# Patient Record
Sex: MALE | Race: ASIAN | NOT HISPANIC OR LATINO | URBAN - METROPOLITAN AREA
[De-identification: names, ages, dates, MRNs, and addresses within clinical notes are randomized per-mention and may not be internally consistent; named-entity substitution may affect disease eponyms.]

---

## 2018-12-19 ENCOUNTER — EMERGENCY (EMERGENCY)
Facility: HOSPITAL | Age: 23
LOS: 1 days | Discharge: HOME | End: 2018-12-19
Attending: EMERGENCY MEDICINE
Payer: COMMERCIAL

## 2018-12-19 VITALS
TEMPERATURE: 98 F | DIASTOLIC BLOOD PRESSURE: 82 MMHG | RESPIRATION RATE: 16 BRPM | HEART RATE: 88 BPM | HEIGHT: 68 IN | SYSTOLIC BLOOD PRESSURE: 145 MMHG | OXYGEN SATURATION: 99 % | WEIGHT: 171.96 LBS

## 2018-12-19 VITALS — OXYGEN SATURATION: 100 %

## 2018-12-19 PROCEDURE — 96372 THER/PROPH/DIAG INJ SC/IM: CPT

## 2018-12-19 PROCEDURE — 99283 EMERGENCY DEPT VISIT LOW MDM: CPT | Mod: 25

## 2018-12-19 PROCEDURE — 87081 CULTURE SCREEN ONLY: CPT

## 2018-12-19 RX ORDER — IBUPROFEN 200 MG
1 TABLET ORAL
Qty: 40 | Refills: 0 | OUTPATIENT
Start: 2018-12-19 | End: 2018-12-28

## 2018-12-19 RX ORDER — PENICILLIN G BENZATHINE 1200000 [IU]/2ML
1.2 INJECTION, SUSPENSION INTRAMUSCULAR ONCE
Qty: 0 | Refills: 0 | Status: COMPLETED | OUTPATIENT
Start: 2018-12-19 | End: 2018-12-19

## 2018-12-19 RX ORDER — IBUPROFEN 200 MG
800 TABLET ORAL ONCE
Qty: 0 | Refills: 0 | Status: COMPLETED | OUTPATIENT
Start: 2018-12-19 | End: 2018-12-19

## 2018-12-19 RX ADMIN — Medication 800 MILLIGRAM(S): at 01:18

## 2018-12-19 RX ADMIN — PENICILLIN G BENZATHINE 1.2 MILLION UNIT(S): 1200000 INJECTION, SUSPENSION INTRAMUSCULAR at 01:18

## 2018-12-19 NOTE — ED PROVIDER NOTE - MEDICAL DECISION MAKING DETAILS
22yo M presents with throat pain and fever. Exam cw strep pharyngitis. given bicillin IM and ibuprofen. Pt stable for discharge.

## 2018-12-19 NOTE — ED ADULT NURSE NOTE - NSIMPLEMENTINTERV_GEN_ALL_ED
Implemented All Universal Safety Interventions:  [place] to call system. Call bell, personal items and telephone within reach. Instruct patient to call for assistance. Room bathroom lighting operational. Non-slip footwear when patient is off stretcher. Physically safe environment: no spills, clutter or unnecessary equipment. Stretcher in lowest position, wheels locked, appropriate side rails in place.

## 2018-12-19 NOTE — ED PROVIDER NOTE - OBJECTIVE STATEMENT
22yo M presents with sore throat, fevers and body aches for 3 days. Denies vomiting, today he coughed up a small amount of blood thus came to ED for evaluation.

## 2018-12-19 NOTE — ED PROVIDER NOTE - THROAT FINDINGS
uvula midline/THROAT RED/white exudate and clotted blood overlying L tonsilar area, no significant swelling./OROPHARYNGEAL EXUDATE/NO DROOLING/NO TONGUE ELEVATION

## 2018-12-19 NOTE — ED PROVIDER NOTE - NSFOLLOWUPINSTRUCTIONS_ED_ALL_ED_FT
Continue ibuprofen every 6 hours as needed for fever/pain. Followup with your primary acre provider for reevaluation.

## 2018-12-21 LAB
CULTURE RESULTS: SIGNIFICANT CHANGE UP
SPECIMEN SOURCE: SIGNIFICANT CHANGE UP

## 2019-03-27 ENCOUNTER — EMERGENCY (EMERGENCY)
Facility: HOSPITAL | Age: 24
LOS: 1 days | Discharge: HOME | End: 2019-03-27
Attending: EMERGENCY MEDICINE
Payer: COMMERCIAL

## 2019-03-27 VITALS
TEMPERATURE: 98 F | RESPIRATION RATE: 16 BRPM | DIASTOLIC BLOOD PRESSURE: 77 MMHG | SYSTOLIC BLOOD PRESSURE: 111 MMHG | HEIGHT: 68 IN | WEIGHT: 169.98 LBS | OXYGEN SATURATION: 98 % | HEART RATE: 78 BPM

## 2019-03-27 PROCEDURE — 93005 ELECTROCARDIOGRAM TRACING: CPT

## 2019-03-27 PROCEDURE — 93010 ELECTROCARDIOGRAM REPORT: CPT

## 2019-03-27 PROCEDURE — 71250 CT THORAX DX C-: CPT

## 2019-03-27 PROCEDURE — 71250 CT THORAX DX C-: CPT | Mod: 26

## 2019-03-27 PROCEDURE — 99284 EMERGENCY DEPT VISIT MOD MDM: CPT

## 2019-03-27 PROCEDURE — 99284 EMERGENCY DEPT VISIT MOD MDM: CPT | Mod: 25

## 2019-03-27 RX ORDER — ACETAMINOPHEN 500 MG
650 TABLET ORAL ONCE
Qty: 0 | Refills: 0 | Status: COMPLETED | OUTPATIENT
Start: 2019-03-27 | End: 2019-03-27

## 2019-03-27 RX ADMIN — Medication 650 MILLIGRAM(S): at 23:17

## 2019-03-27 RX ADMIN — Medication 650 MILLIGRAM(S): at 23:47

## 2019-03-27 NOTE — ED ADULT NURSE NOTE - CHPI ED NUR SYMPTOMS NEG
no vomiting/no chills/no dizziness/no fever/no back pain/no nausea/no syncope/no diaphoresis/no congestion

## 2019-03-27 NOTE — ED ADULT NURSE NOTE - OBJECTIVE STATEMENT
pt came in w c/o chest pain & sob s/p having 250lb barbell fall on his chest during exercise. pt states cp worsens w/ inspiration. No apparent injuries or deformities noted. Denies headache, dizziness. Denies LOC. breathing unlabored.

## 2019-03-27 NOTE — ED ADULT TRIAGE NOTE - CHIEF COMPLAINT QUOTE
c/o sob,chest pain, while doing weight lifting a 250 lbs weight fell to her chest at about 9 pm today

## 2019-03-28 NOTE — ED PROVIDER NOTE - OBJECTIVE STATEMENT
25 y/o M patient w/ no significant PMHx and no significant PSHx presents to the ED with to the ED with chest pain. Patient reports he was at the gym when he was doing a bench press and a 250lb weight slipped from his hands and fell on his chest. Patient says the weight was on his chest for a short time with other individuals pulling the weight off of him. Patient endorses left lateral chest pain with mild shortness of breath. Patient says he has mild pain when he breaths in. Patient denies any other complaints. NKDA.

## 2019-03-28 NOTE — ED PROVIDER NOTE - CLINICAL SUMMARY MEDICAL DECISION MAKING FREE TEXT BOX
24 year old male coming in with cp after 150lb weight fell on chest. vitals WNL. PE as above.  ct chest is unremarkable. symptoms likely 2/2 contusion. pain improved with tylenol. will dc. f/u PMD. return precautions given.

## 2024-08-08 NOTE — ED PROVIDER NOTE - PSYCHIATRIC, MLM
Satisfactory
warm
Alert and oriented to person, place, time/situation. normal mood and affect. no apparent risk to self or others.

## 2024-11-05 NOTE — ED ADULT TRIAGE NOTE - ACCOMPANIED BY
Acute low back pain without sciatica, unspecified back pain laterality  Acetaminophen Arthritis for pain per pkg instructions  Cold therapy for 20-30 mins several times a day ( no direct ice to skin; wrap in moist towel)  Can alternate with topical heat 20-30 minutes several times a day  (if using a heating pad use on low setting; do not sleep on heating pad)  Low-stress aerobic exercise (walking, bike riding, swimming and eventually jogging) can be gradually and incrementally started within the first 2 wks of symptoms  Avoid jerky, hurried movements when lifting  Lift with legs by straddling the load; bend knees to  load; keep back straight (do not bend)  Avoid twisting, bending, reaching while lifting  Avoid prolonged sitting  Change positions often while sitting  A soft support at small of back, arm rests to support some body weight, a slight recline in chair back may make sitting more comfortable  Firm mattress/bed board, lying supine with hips and knees flexed on pillows is beneficial while sleeping  Follow up with PCP if: symptoms not improving within 1 wk  Follow up with ER if:  fever (100.5 or greater), increasing back pain,  painful urination, inability to hold bowel or bladder contents, muscle weakness, loss of sensation  and or numbness/tingling in lower extremities.     Pt counseled on low back pain, medication/s to use & when to seek emergency care vs PCP follow up.  Pt verbalizes understanding of  discharge instructions. No questions/concerns regarding this visit   
Parent

## 2024-12-28 NOTE — ED ADULT NURSE NOTE - CINV DISCH TEACH PARTICIP
There can often be a delay in getting into see a spine specialist so if there is a delay in that appointment I would set up an appointment with your primary care clinic in about 2 to 3 weeks after starting the Lyrica so that you can review with them how it is working for you and if anything else needs to be done while waiting for your appointment.    Start your prednisone tomorrow either with breakfast or lunch but take it with food to avoid stomach upset.   by Dr Diaz/Patient